# Patient Record
Sex: FEMALE | Race: ASIAN | NOT HISPANIC OR LATINO | Employment: FULL TIME
[De-identification: names, ages, dates, MRNs, and addresses within clinical notes are randomized per-mention and may not be internally consistent; named-entity substitution may affect disease eponyms.]

---

## 2017-12-29 ENCOUNTER — RECORDS - HEALTHEAST (OUTPATIENT)
Dept: ADMINISTRATIVE | Facility: OTHER | Age: 27
End: 2017-12-29

## 2017-12-31 ENCOUNTER — ANESTHESIA - HEALTHEAST (OUTPATIENT)
Dept: OBGYN | Facility: CLINIC | Age: 27
End: 2017-12-31

## 2021-04-02 ENCOUNTER — AMBULATORY - HEALTHEAST (OUTPATIENT)
Dept: NURSING | Facility: CLINIC | Age: 31
End: 2021-04-02

## 2021-04-23 ENCOUNTER — AMBULATORY - HEALTHEAST (OUTPATIENT)
Dept: NURSING | Facility: CLINIC | Age: 31
End: 2021-04-23

## 2021-06-15 NOTE — ANESTHESIA PROCEDURE NOTES
Epidural Block    Patient location during procedure: OB  Time Called: 12/31/2017 8:09 PM  Reason for Block:labor epidural  Staffing:  Performing  Anesthesiologist: LAUREN LILLY  Preanesthetic Checklist  Completed: patient identified, risks, benefits, and alternatives discussed, timeout performed, consent obtained, at patient's request, airway assessed, oxygen available, suction available, emergency drugs available and hand hygiene performed  Procedure  Patient position: sitting  Prep: ChloraPrep  Patient monitoring: blood pressure, heart rate and continuous pulse oximetry  Approach: midline  Location: L3-L4  Injection technique: LEON saline (PFNS)  Number of Attempts:1  Needle  Needle type: [facility]   Needle gauge: 18 G     Catheter in Space: 4      Additional Notes:  Negative CSF, heme, paresthesia    Expiration 09/18

## 2021-06-15 NOTE — ANESTHESIA PREPROCEDURE EVALUATION
Anesthesia Evaluation      Patient summary reviewed   No history of anesthetic complications     Airway    Pulmonary                           Cardiovascular    Neuro/Psych      Endo/Other    (+) pregnant     GI/Hepatic/Renal            Dental                         Anesthesia Plan  Planned anesthetic: epidural    ASA 2     Anesthetic plan and risks discussed with: patient            Patient requesting labor epidural, chart reviewed.  Discussed risks including hypotension, nerve damage, infection, and post dural puncture headache.  Patient understands, questions answered, and agrees to proceed.  Time out instituted prior to placement. Hands washed, sterile gloves and mask worn.

## 2021-06-15 NOTE — ANESTHESIA POSTPROCEDURE EVALUATION
"Patient: Sana Zimmerman  * No procedures listed *  Anesthesia type: epidural    Patient location: Labor and Delivery  Last vitals:   Vitals:    01/01/18 0430   BP: 110/68   Pulse: (!) 126   Resp: 16   Temp: (!) 38.9  C (102.1  F)   SpO2:      Post vital signs: stable  Level of consciousness: awake and responds to simple questions  Post-anesthesia pain: pain controlled  Post-anesthesia nausea and vomiting: no  Pulmonary: unassisted, return to baseline  Cardiovascular: stable and blood pressure at baseline  Hydration: adequate  Anesthetic events: no    QCDR Measures:  ASA# 11 - Ignacia-op Cardiac Arrest: ASA11B - Patient did NOT experience unanticipated cardiac arrest  ASA# 12 - Ignacia-op Mortality Rate: ASA12B - Patient did NOT die  ASA# 13 - PACU Re-Intubation Rate: NA - No ETT / LMA used for case  ASA# 10 - Composite Anes Safety: ASA10A - No serious adverse event    Additional Notes:  RN report since patient sleeping soundly.  Epidural worked \"great\"  "

## 2021-08-22 ENCOUNTER — HEALTH MAINTENANCE LETTER (OUTPATIENT)
Age: 31
End: 2021-08-22

## 2021-10-17 ENCOUNTER — HEALTH MAINTENANCE LETTER (OUTPATIENT)
Age: 31
End: 2021-10-17

## 2022-10-02 ENCOUNTER — HEALTH MAINTENANCE LETTER (OUTPATIENT)
Age: 32
End: 2022-10-02

## 2023-10-12 ENCOUNTER — APPOINTMENT (OUTPATIENT)
Dept: CT IMAGING | Facility: CLINIC | Age: 33
End: 2023-10-12
Attending: EMERGENCY MEDICINE
Payer: COMMERCIAL

## 2023-10-12 ENCOUNTER — HOSPITAL ENCOUNTER (EMERGENCY)
Facility: CLINIC | Age: 33
Discharge: HOME | End: 2023-10-12
Attending: EMERGENCY MEDICINE | Admitting: EMERGENCY MEDICINE
Payer: COMMERCIAL

## 2023-10-12 ENCOUNTER — TELEPHONE (OUTPATIENT)
Dept: SCHEDULING | Facility: CLINIC | Age: 33
End: 2023-10-12

## 2023-10-12 VITALS
BODY MASS INDEX: 39.2 KG/M2 | WEIGHT: 213 LBS | DIASTOLIC BLOOD PRESSURE: 87 MMHG | OXYGEN SATURATION: 97 % | SYSTOLIC BLOOD PRESSURE: 141 MMHG | RESPIRATION RATE: 20 BRPM | TEMPERATURE: 98.9 F | HEIGHT: 62 IN | HEART RATE: 95 BPM

## 2023-10-12 DIAGNOSIS — K52.9 COLITIS: ICD-10-CM

## 2023-10-12 DIAGNOSIS — T83.511A URINARY TRACT INFECTION ASSOCIATED WITH CATHETERIZATION OF URINARY TRACT, UNSPECIFIED INDWELLING URINARY CATHETER TYPE, INITIAL ENCOUNTER (H): ICD-10-CM

## 2023-10-12 DIAGNOSIS — J02.0 STREP THROAT: ICD-10-CM

## 2023-10-12 DIAGNOSIS — N39.0 URINARY TRACT INFECTION ASSOCIATED WITH CATHETERIZATION OF URINARY TRACT, UNSPECIFIED INDWELLING URINARY CATHETER TYPE, INITIAL ENCOUNTER (H): ICD-10-CM

## 2023-10-12 LAB
ALBUMIN SERPL BCG-MCNC: 3.9 G/DL (ref 3.5–5.2)
ALBUMIN UR-MCNC: 50 MG/DL
ALP SERPL-CCNC: 68 U/L (ref 35–104)
ALT SERPL W P-5'-P-CCNC: 49 U/L (ref 0–50)
ANION GAP SERPL CALCULATED.3IONS-SCNC: 13 MMOL/L (ref 7–15)
APPEARANCE UR: CLEAR
AST SERPL W P-5'-P-CCNC: 59 U/L (ref 0–45)
ATRIAL RATE - MUSE: 121 BPM
BACTERIA #/AREA URNS HPF: ABNORMAL /HPF
BASO+EOS+MONOS # BLD AUTO: NORMAL 10*3/UL
BASO+EOS+MONOS NFR BLD AUTO: NORMAL %
BASOPHILS # BLD AUTO: 0 10E3/UL (ref 0–0.2)
BASOPHILS NFR BLD AUTO: 0 %
BILIRUB SERPL-MCNC: 0.3 MG/DL
BILIRUB UR QL STRIP: NEGATIVE
BUN SERPL-MCNC: 7.1 MG/DL (ref 6–20)
C DIFF TOX B STL QL: NEGATIVE
CALCIUM SERPL-MCNC: 9.3 MG/DL (ref 8.6–10)
CHLORIDE SERPL-SCNC: 101 MMOL/L (ref 98–107)
COLOR UR AUTO: YELLOW
CREAT SERPL-MCNC: 0.72 MG/DL (ref 0.51–0.95)
DEPRECATED HCO3 PLAS-SCNC: 21 MMOL/L (ref 22–29)
DIASTOLIC BLOOD PRESSURE - MUSE: 117 MMHG
EGFRCR SERPLBLD CKD-EPI 2021: >90 ML/MIN/1.73M2
EOSINOPHIL # BLD AUTO: 0.2 10E3/UL (ref 0–0.7)
EOSINOPHIL NFR BLD AUTO: 2 %
ERYTHROCYTE [DISTWIDTH] IN BLOOD BY AUTOMATED COUNT: 12 % (ref 10–15)
FLUAV RNA SPEC QL NAA+PROBE: NEGATIVE
FLUBV RNA RESP QL NAA+PROBE: NEGATIVE
GLUCOSE SERPL-MCNC: 123 MG/DL (ref 70–99)
GLUCOSE UR STRIP-MCNC: NEGATIVE MG/DL
GROUP A STREP BY PCR: DETECTED
HCG UR QL: NEGATIVE
HCT VFR BLD AUTO: 41.2 % (ref 35–47)
HGB BLD-MCNC: 13.8 G/DL (ref 11.7–15.7)
HGB UR QL STRIP: ABNORMAL
IMM GRANULOCYTES # BLD: 0.1 10E3/UL
IMM GRANULOCYTES NFR BLD: 1 %
INTERPRETATION ECG - MUSE: NORMAL
KETONES UR STRIP-MCNC: NEGATIVE MG/DL
LACTATE SERPL-SCNC: 1.1 MMOL/L (ref 0.7–2)
LEUKOCYTE ESTERASE UR QL STRIP: ABNORMAL
LIPASE SERPL-CCNC: 32 U/L (ref 13–60)
LYMPHOCYTES # BLD AUTO: 0.9 10E3/UL (ref 0.8–5.3)
LYMPHOCYTES NFR BLD AUTO: 10 %
MCH RBC QN AUTO: 29.6 PG (ref 26.5–33)
MCHC RBC AUTO-ENTMCNC: 33.5 G/DL (ref 31.5–36.5)
MCV RBC AUTO: 88 FL (ref 78–100)
MONOCYTES # BLD AUTO: 0.5 10E3/UL (ref 0–1.3)
MONOCYTES NFR BLD AUTO: 5 %
MUCOUS THREADS #/AREA URNS LPF: PRESENT /LPF
NEUTROPHILS # BLD AUTO: 7.8 10E3/UL (ref 1.6–8.3)
NEUTROPHILS NFR BLD AUTO: 82 %
NITRATE UR QL: NEGATIVE
NRBC # BLD AUTO: 0 10E3/UL
NRBC BLD AUTO-RTO: 0 /100
P AXIS - MUSE: 59 DEGREES
PH UR STRIP: 6 [PH] (ref 5–7)
PLATELET # BLD AUTO: 238 10E3/UL (ref 150–450)
POTASSIUM SERPL-SCNC: 3.3 MMOL/L (ref 3.4–5.3)
PR INTERVAL - MUSE: 134 MS
PROT SERPL-MCNC: 7.9 G/DL (ref 6.4–8.3)
QRS DURATION - MUSE: 84 MS
QT - MUSE: 326 MS
QTC - MUSE: 462 MS
R AXIS - MUSE: 61 DEGREES
RBC # BLD AUTO: 4.66 10E6/UL (ref 3.8–5.2)
RBC URINE: 22 /HPF
RSV RNA SPEC NAA+PROBE: NEGATIVE
SARS-COV-2 RNA RESP QL NAA+PROBE: NEGATIVE
SODIUM SERPL-SCNC: 135 MMOL/L (ref 135–145)
SP GR UR STRIP: 1.01 (ref 1–1.03)
SQUAMOUS EPITHELIAL: 4 /HPF
SYSTOLIC BLOOD PRESSURE - MUSE: 174 MMHG
T AXIS - MUSE: 54 DEGREES
UROBILINOGEN UR STRIP-MCNC: <2 MG/DL
VENTRICULAR RATE- MUSE: 121 BPM
WBC # BLD AUTO: 9.6 10E3/UL (ref 4–11)
WBC URINE: 6 /HPF

## 2023-10-12 PROCEDURE — 80053 COMPREHEN METABOLIC PANEL: CPT | Performed by: EMERGENCY MEDICINE

## 2023-10-12 PROCEDURE — 87040 BLOOD CULTURE FOR BACTERIA: CPT | Performed by: EMERGENCY MEDICINE

## 2023-10-12 PROCEDURE — 250N000011 HC RX IP 250 OP 636: Mod: JZ | Performed by: EMERGENCY MEDICINE

## 2023-10-12 PROCEDURE — 74177 CT ABD & PELVIS W/CONTRAST: CPT

## 2023-10-12 PROCEDURE — 87493 C DIFF AMPLIFIED PROBE: CPT | Performed by: EMERGENCY MEDICINE

## 2023-10-12 PROCEDURE — 93005 ELECTROCARDIOGRAM TRACING: CPT | Performed by: EMERGENCY MEDICINE

## 2023-10-12 PROCEDURE — 96374 THER/PROPH/DIAG INJ IV PUSH: CPT | Mod: 59

## 2023-10-12 PROCEDURE — 96361 HYDRATE IV INFUSION ADD-ON: CPT

## 2023-10-12 PROCEDURE — 81025 URINE PREGNANCY TEST: CPT | Performed by: EMERGENCY MEDICINE

## 2023-10-12 PROCEDURE — 87651 STREP A DNA AMP PROBE: CPT | Performed by: EMERGENCY MEDICINE

## 2023-10-12 PROCEDURE — 99285 EMERGENCY DEPT VISIT HI MDM: CPT | Mod: 25

## 2023-10-12 PROCEDURE — 36415 COLL VENOUS BLD VENIPUNCTURE: CPT | Performed by: EMERGENCY MEDICINE

## 2023-10-12 PROCEDURE — 250N000013 HC RX MED GY IP 250 OP 250 PS 637: Performed by: EMERGENCY MEDICINE

## 2023-10-12 PROCEDURE — 83605 ASSAY OF LACTIC ACID: CPT | Performed by: EMERGENCY MEDICINE

## 2023-10-12 PROCEDURE — 258N000003 HC RX IP 258 OP 636: Performed by: EMERGENCY MEDICINE

## 2023-10-12 PROCEDURE — 83690 ASSAY OF LIPASE: CPT | Performed by: EMERGENCY MEDICINE

## 2023-10-12 PROCEDURE — 81001 URINALYSIS AUTO W/SCOPE: CPT | Performed by: EMERGENCY MEDICINE

## 2023-10-12 PROCEDURE — 87637 SARSCOV2&INF A&B&RSV AMP PRB: CPT | Performed by: EMERGENCY MEDICINE

## 2023-10-12 PROCEDURE — 85025 COMPLETE CBC W/AUTO DIFF WBC: CPT | Performed by: EMERGENCY MEDICINE

## 2023-10-12 RX ORDER — ACETAMINOPHEN 325 MG/1
650 TABLET ORAL ONCE
Status: COMPLETED | OUTPATIENT
Start: 2023-10-12 | End: 2023-10-12

## 2023-10-12 RX ORDER — IOPAMIDOL 755 MG/ML
100 INJECTION, SOLUTION INTRAVASCULAR ONCE
Status: COMPLETED | OUTPATIENT
Start: 2023-10-12 | End: 2023-10-12

## 2023-10-12 RX ORDER — CEFTRIAXONE 1 G/1
1 INJECTION, POWDER, FOR SOLUTION INTRAMUSCULAR; INTRAVENOUS ONCE
Qty: 10 ML | Refills: 0 | Status: COMPLETED | OUTPATIENT
Start: 2023-10-12 | End: 2023-10-12

## 2023-10-12 RX ORDER — CEFDINIR 300 MG/1
300 CAPSULE ORAL 2 TIMES DAILY
Qty: 14 CAPSULE | Refills: 0 | Status: SHIPPED | OUTPATIENT
Start: 2023-10-12

## 2023-10-12 RX ORDER — IBUPROFEN 400 MG/1
400 TABLET, FILM COATED ORAL ONCE
Status: COMPLETED | OUTPATIENT
Start: 2023-10-12 | End: 2023-10-12

## 2023-10-12 RX ADMIN — SODIUM CHLORIDE 1000 ML: 9 INJECTION, SOLUTION INTRAVENOUS at 11:47

## 2023-10-12 RX ADMIN — ACETAMINOPHEN 650 MG: 325 TABLET ORAL at 11:42

## 2023-10-12 RX ADMIN — CEFTRIAXONE 1 G: 1 INJECTION, POWDER, FOR SOLUTION INTRAMUSCULAR; INTRAVENOUS at 13:09

## 2023-10-12 RX ADMIN — IOPAMIDOL 100 ML: 755 INJECTION, SOLUTION INTRAVENOUS at 12:01

## 2023-10-12 RX ADMIN — SODIUM CHLORIDE 1000 ML: 9 INJECTION, SOLUTION INTRAVENOUS at 14:02

## 2023-10-12 RX ADMIN — IBUPROFEN 400 MG: 400 TABLET ORAL at 11:42

## 2023-10-12 ASSESSMENT — ACTIVITIES OF DAILY LIVING (ADL)
ADLS_ACUITY_SCORE: 35
ADLS_ACUITY_SCORE: 35
ADLS_ACUITY_SCORE: 33

## 2023-10-12 ASSESSMENT — ENCOUNTER SYMPTOMS
HEADACHES: 1
COUGH: 1
SINUS PAIN: 1
FEVER: 1
ABDOMINAL PAIN: 1
NECK STIFFNESS: 0
DYSURIA: 0
DIARRHEA: 1
VOMITING: 0

## 2023-10-12 NOTE — TELEPHONE ENCOUNTER
Reach out to patient per ED referral for patient to [facility] for hospital follow up. Patient only wants to be seen in [place] and no appointment within 3-5 day range according to referral. Patient was schedule out to October 24 will forward message to clinic to see if clinic could squeeze patient in within 3-5 days or okay to wait it out that long. Please call patient back to confirm.

## 2023-10-12 NOTE — Clinical Note
Sana Zimmerman was seen and treated in our emergency department on 10/12/2023.  She may return to work on 10/14/2023.       If you have any questions or concerns, please don't hesitate to call.      Syed Goldman MD

## 2023-10-12 NOTE — DISCHARGE INSTRUCTIONS
As we discussed your urine is suggestive of urinary tract infection.  You tested positive for strep.  You also have colitis.  We will call you if the stool tests develop any concerning organisms.  Recheck with primary care doctor for colonoscopy.  Recommend Omnicef twice daily for 7 days.  Return to the ER for worsening symptoms

## 2023-10-12 NOTE — ED PROVIDER NOTES
EMERGENCY DEPARTMENT ENCOUNTER      NAME: Sana Zimmerman  AGE: 33 year old female  YOB: 1990  MRN: [id number]  EVALUATION DATE & TIME: No admission date for patient encounter.    PCP: Aziza Collins    ED PROVIDER: Fidencio Goldman MD        Chief Complaint   Patient presents with    Back Pain    Abdominal Pain         FINAL IMPRESSION:  1. Colitis    2. Strep throat    3. Urinary tract infection associated with catheterization of urinary tract, unspecified indwelling urinary catheter type, initial encounter           ED COURSE & MEDICAL DECISION MAKING:    Pertinent Labs & Imaging studies reviewed. (See chart for details)  33 year old female presents to the Emergency Department for evaluation of days of fever and abdominal pain and back pain    Overall well-appearing on examination.  Febrile and tachycardic in triage.  Patient seen initially in waiting room secondary severe critical capacity in hospital and ER.  Has some epigastric and right upper quadrant tenderness    Differential includes pyelonephritis, cholecystitis, pancreatitis, appendicitis, colitis, pneumonia, COVID-19, strep throat, epidural abscess, discitis, sinusitis, perforated viscus, gastroenteritis sepsis    Patient is febrile has not had ibuprofen since 3 AM or Tylenol since 3 AM.  Will give I Profen and Tylenol here.  Send blood cultures.  Give 2 L IV fluids.  Will obtain lactic acid, CBC, metabolic panel, lipase, UA, COVID, influenza, and CT abdomen pelvis          ED Course as of 10/12/23 1536   Thu Oct 12, 2023   1219 Well-appearing 33-year-old here with fever and diarrhea and abdominal discomfort   1219 Plan for UA, ECG, CBC, metabolic panel, COVID-19, group A strep, lipase and lactic acid   1219 Tylenol and ibuprofen given   1219 UA is suggestive of UTI   1220 WBC: 9.6   1220 Platelet Count: 238   1220 Hemoglobin: 13.8   1220 HCG Qual Urine: Negative   1220 Bilirubin Total: 0.3   1220 ALT: 49   1220 AST(!): 59   1220 Glucose(!): 123    1220 GFR Estimate: >90   1220 Carbon Dioxide (CO2)(!): 21   1220 Potassium(!): 3.3   1220 Sodium: 135   1220 Lactic Acid: 1.1   1220 Lipase: 32     Tachycardia resolved.  Fever resolved.  Strep test came back positive.  Urine suggestive of UTI.  Will treat with Omnicef to cover for cystitis and strep throat.  CT shows changes suggestive of colitis.  Stool cultures and stool panel sent.  Discussed need to follow primary care doctor for colonoscopy.    Plan for discharge home with Omnicef twice daily for 7 days and follow-up primary care doctor return to the ER for worsening symptoms    10:28 AM I met with the patient to gather history and to perform my initial exam. I discussed the plan for care while in the Emergency Department. Appropriate PPE was worn during patient encounters.      Medical Decision Making    History:  Supplemental history from: Family Member/Significant Other  External Record(s) reviewed: Outpatient Record: 6/20/2023 (See HPI)    Work Up:  Chart documentation includes differential considered and any EKGs or imaging independently interpreted by provider, where specified.  In additional to work up documented, I considered the following work up: Documented in chart, if applicable.    External consultation:  Discussion of management with another provider: Documented in chart, if applicable    Complicating factors:  Care impacted by chronic illness: N/A  Care affected by social determinants of health: Access to Medical Care    Disposition considerations: Discharge. I prescribed additional prescription strength medication(s) as charted. I considered admission, but ultimately discharged patient with resolution of tachycardia and fever and improvement in symptoms.          Voice recognition software was used in the creation of this note. Any grammatical or nonsensical errors are due to inherent errors with the software and are not the intention of the writer.         At the conclusion of the encounter  "I discussed the results of all of the tests and the disposition. The questions were answered. The patient or family acknowledged understanding and was agreeable with the care plan.             MEDICATIONS GIVEN IN THE EMERGENCY:  Medications   sodium chloride 0.9% BOLUS 1,000 mL (0 mLs Intravenous Stopped 10/12/23 1505)   sodium chloride 0.9% BOLUS 1,000 mL (0 mLs Intravenous Stopped 10/12/23 1401)   acetaminophen (TYLENOL) tablet 650 mg (650 mg Oral $Given 10/12/23 1142)   ibuprofen (ADVIL/MOTRIN) tablet 400 mg (400 mg Oral $Given 10/12/23 1142)   iopamidol (ISOVUE-370) solution 100 mL (100 mLs Intravenous $Given 10/12/23 1201)   cefTRIAXone (ROCEPHIN) 1 g vial to attach to  mL bag for ADULTS or NS 50 mL bag for PEDS (0 g Intravenous Stopped 10/12/23 1324)       NEW PRESCRIPTIONS STARTED AT TODAY'S ER VISIT  New Prescriptions    CEFDINIR (OMNICEF) 300 MG CAPSULE    Take 1 capsule (300 mg) by mouth 2 times daily          =================================================================    HPI    Triage note  \" \"      Patient information was obtained from: Patient    Use of : N/A        Sana Zimmerman is a 33 year old female with a pertinent history of partum hemorrhage, obesity, PCOS, high blood pressure who presents to this ED via private car for evaluation of fever, abdominal pain, back pain, cough, 3 weeks of sinus congestion, loose stools    Per chart review, patient was seen on 6/20 (~4 months ago) at [facility] for a cardiology follow up. Patient advised to stop taking rosuvastatin and coached on methods to help her lower her LDL.     Patient states that sinus congestion for 3 weeks.  Started helping a fever 3 days ago, right upper quadrant epigastric abdominal pain, low midline back pain, headache, sore throat, cough x3 days    COVID-19 test at home yesterday negative.  Denies dysuria.  4 loose stools today.  4 loose stools yesterday.  No blood in stool.    Has not been taking her metformin or " "medicine for high blood pressure last week or so.    States she is currently on her menstrual cycle.  Reports she is on birth control.          REVIEW OF SYSTEMS   Review of Systems   Constitutional:  Positive for fever.   HENT:  Positive for congestion and sinus pain.    Respiratory:  Positive for cough.    Cardiovascular:  Negative for chest pain.   Gastrointestinal:  Positive for abdominal pain and diarrhea. Negative for vomiting.   Genitourinary:  Negative for dysuria.   Musculoskeletal:  Negative for neck stiffness.   Skin:  Negative for rash.   Neurological:  Positive for headaches.        PAST MEDICAL HISTORY:  History reviewed. No pertinent past medical history.    PAST SURGICAL HISTORY:  History reviewed. No pertinent surgical history.        CURRENT MEDICATIONS:    cefdinir (OMNICEF) 300 MG capsule  prenatal vitamin iron-folic acid 27mg-0.8mg (PRENATAL S) 27 mg iron- 800 mcg Tab tablet        ALLERGIES:  No Known Allergies    FAMILY HISTORY:  Family History   Problem Relation Age of Onset    Cancer Mother        SOCIAL HISTORY:   Social History     Socioeconomic History    Marital status: Single   Tobacco Use    Smoking status: Never    Smokeless tobacco: Never   Substance and Sexual Activity    Alcohol use: No    Drug use: No    Sexual activity: Yes     Partners: Male       VITALS:  BP (!) 141/87   Pulse 95   Temp 98.9  F (37.2  C) (Oral)   Resp 20   Ht 1.575 m (5' 2\")   Wt 96.6 kg (213 lb)   SpO2 97%   BMI 38.96 kg/m      PHYSICAL EXAM      Vitals: BP (!) 141/87   Pulse 95   Temp 98.9  F (37.2  C) (Oral)   Resp 20   Ht 1.575 m (5' 2\")   Wt 96.6 kg (213 lb)   SpO2 97%   BMI 38.96 kg/m    General: Appears in no acute distress, awake, alert, interactive.  Eyes: Conjunctivae non-injected. Sclera anicteric.  HENT: Atraumatic.  Neck: Supple.  Respiratory/Chest: Respiration unlabored.  No crackles or wheezing  Heart: 2+ radial pulses, mild tachycardia  Abdomen: non distended, epigastric and right " upper quadrant tenderness  Musculoskeletal: Normal extremities. No edema or erythema.  Skin: Normal color. No rash or diaphoresis.  Neurologic: Face symmetric, moves all extremities spontaneously. Speech clear.  Psychiatric: Oriented to person, place, and time. Affect appropriate.       LAB:  All pertinent labs reviewed and interpreted.  Results for orders placed or performed during the hospital encounter of 10/12/23   CT Abdomen Pelvis w Contrast    Impression    IMPRESSION:   1.  Mild colitis throughout the transverse colon.    2.  No abscess.    3.  Solitary 2 cm liver lesion centrally in segment 4A, features most suggestive of a benign cavernous hemangioma although not completely characterize. Limited ultrasound liver most likely could confirm this if also features of hemangioma.   Comprehensive metabolic panel   Result Value Ref Range    Sodium 135 135 - 145 mmol/L    Potassium 3.3 (L) 3.4 - 5.3 mmol/L    Carbon Dioxide (CO2) 21 (L) 22 - 29 mmol/L    Anion Gap 13 7 - 15 mmol/L    Urea Nitrogen 7.1 6.0 - 20.0 mg/dL    Creatinine 0.72 0.51 - 0.95 mg/dL    GFR Estimate >90 >60 mL/min/1.73m2    Calcium 9.3 8.6 - 10.0 mg/dL    Chloride 101 98 - 107 mmol/L    Glucose 123 (H) 70 - 99 mg/dL    Alkaline Phosphatase 68 35 - 104 U/L    AST 59 (H) 0 - 45 U/L    ALT 49 0 - 50 U/L    Protein Total 7.9 6.4 - 8.3 g/dL    Albumin 3.9 3.5 - 5.2 g/dL    Bilirubin Total 0.3 <=1.2 mg/dL   Result Value Ref Range    Lactic Acid 1.1 0.7 - 2.0 mmol/L   UA with Microscopic reflex to Culture    Specimen: Urine, Clean Catch   Result Value Ref Range    Color Urine Yellow Colorless, Straw, Light Yellow, Yellow    Appearance Urine Clear Clear    Glucose Urine Negative Negative mg/dL    Bilirubin Urine Negative Negative    Ketones Urine Negative Negative mg/dL    Specific Gravity Urine 1.013 1.001 - 1.030    Blood Urine >1.0 mg/dL (A) Negative    pH Urine 6.0 5.0 - 7.0    Protein Albumin Urine 50 (A) Negative mg/dL    Urobilinogen Urine <2.0  <2.0 mg/dL    Nitrite Urine Negative Negative    Leukocyte Esterase Urine 25 Martir/uL (A) Negative    Bacteria Urine Few (A) None Seen /HPF    Mucus Urine Present (A) None Seen /LPF    RBC Urine 22 (H) <=2 /HPF    WBC Urine 6 (H) <=5 /HPF    Squamous Epithelials Urine 4 (H) <=1 /HPF   CBC with platelets and differential   Result Value Ref Range    WBC Count 9.6 4.0 - 11.0 10e3/uL    RBC Count 4.66 3.80 - 5.20 10e6/uL    Hemoglobin 13.8 11.7 - 15.7 g/dL    Hematocrit 41.2 35.0 - 47.0 %    MCV 88 78 - 100 fL    MCH 29.6 26.5 - 33.0 pg    MCHC 33.5 31.5 - 36.5 g/dL    RDW 12.0 10.0 - 15.0 %    Platelet Count 238 150 - 450 10e3/uL    % Neutrophils 82 %    % Lymphocytes 10 %    % Monocytes 5 %    Mids % (Monos, Eos, Basos)      % Eosinophils 2 %    % Basophils 0 %    % Immature Granulocytes 1 %    NRBCs per 100 WBC 0 <1 /100    Absolute Neutrophils 7.8 1.6 - 8.3 10e3/uL    Absolute Lymphocytes 0.9 0.8 - 5.3 10e3/uL    Absolute Monocytes 0.5 0.0 - 1.3 10e3/uL    Mids Abs (Monos, Eos, Basos)      Absolute Eosinophils 0.2 0.0 - 0.7 10e3/uL    Absolute Basophils 0.0 0.0 - 0.2 10e3/uL    Absolute Immature Granulocytes 0.1 <=0.4 10e3/uL    Absolute NRBCs 0.0 10e3/uL   Symptomatic Influenza A/B, RSV, & SARS-CoV2 PCR (COVID-19) Nasopharyngeal    Specimen: Nasopharyngeal; Swab   Result Value Ref Range    Influenza A PCR Negative Negative    Influenza B PCR Negative Negative    RSV PCR Negative Negative    SARS CoV2 PCR Negative Negative   Result Value Ref Range    Lipase 32 13 - 60 U/L   HCG qualitative urine   Result Value Ref Range    hCG Urine Qualitative Negative Negative   ECG 12-LEAD WITH MUSE (LHE)   Result Value Ref Range    Systolic Blood Pressure 174 mmHg    Diastolic Blood Pressure 117 mmHg    Ventricular Rate 121 BPM    Atrial Rate 121 BPM    WI Interval 134 ms    QRS Duration 84 ms     ms    QTc 462 ms    P Axis 59 degrees    R AXIS 61 degrees    T Axis 54 degrees    Interpretation ECG       Sinus  tachycardia  Possible Left atrial enlargement  Borderline ECG  No previous ECGs available  Confirmed by SEE ED PROVIDER NOTE FOR, ECG INTERPRETATION ([id number]),  JOY BELL ([id number]) on 10/12/2023 11:02:11 AM     Group A Streptococcus PCR Throat Swab    Specimen: Throat; Swab   Result Value Ref Range    Group A strep by PCR Detected (A) Not Detected       RADIOLOGY:  Reviewed all pertinent imaging. Please see official radiology report.  CT Abdomen Pelvis w Contrast   Final Result   IMPRESSION:    1.  Mild colitis throughout the transverse colon.      2.  No abscess.      3.  Solitary 2 cm liver lesion centrally in segment 4A, features most suggestive of a benign cavernous hemangioma although not completely characterize. Limited ultrasound liver most likely could confirm this if also features of hemangioma.          EKG:    Performed at: 1004    Impression: sinus tachycardia and possible left atrial enlargement    Rate: 121  Rhythm: sinus tachycardia  Axis: 61  AK Interval: 134  QRS Interval: 84  QTc Interval: 462  ST Changes: none  Comparison: none    I have independently reviewed and interpreted the EKG(s) documented above.        I, Merari Urias, am serving as a scribe to document services personally performed by Syed Goldman MD based on my observation and the provider's statements to me. I, Dr. Syed Goldman, attest that Merari Urias is acting in a scribe capacity, has observed my performance of the services and has documented them in accordance with my direction.    Syed Goldman MD  Emergency Medicine  [facility] EMERGENCY ROOM  [address] [facility] [address]  [phone]       Syed Goldman MD  10/12/23 3692

## 2023-10-13 NOTE — TELEPHONE ENCOUNTER
Patient was called and scheduled for a office visit on 10/17/2023 to establish care and for Hospital follow up.

## 2023-10-17 PROBLEM — R73.01 IFG (IMPAIRED FASTING GLUCOSE): Status: ACTIVE | Noted: 2022-07-01

## 2023-10-17 PROBLEM — I10 HTN (HYPERTENSION): Status: ACTIVE | Noted: 2022-07-14

## 2023-10-17 PROBLEM — R94.31 PROLONGED QT INTERVAL: Status: ACTIVE | Noted: 2022-07-14

## 2023-10-17 PROBLEM — R79.89 ELEVATED TESTOSTERONE LEVEL: Status: ACTIVE | Noted: 2023-06-01

## 2023-10-17 PROBLEM — R74.01 TRANSAMINITIS: Status: ACTIVE | Noted: 2022-07-01

## 2023-10-17 PROBLEM — E28.2 PCOS (POLYCYSTIC OVARIAN SYNDROME): Status: ACTIVE | Noted: 2023-04-05

## 2023-10-17 PROBLEM — E78.5 DYSLIPIDEMIA: Status: ACTIVE | Noted: 2022-07-25

## 2023-10-17 LAB — BACTERIA BLD CULT: NO GROWTH

## 2024-03-09 ENCOUNTER — HEALTH MAINTENANCE LETTER (OUTPATIENT)
Age: 34
End: 2024-03-09

## 2025-03-16 ENCOUNTER — HEALTH MAINTENANCE LETTER (OUTPATIENT)
Age: 35
End: 2025-03-16